# Patient Record
Sex: FEMALE | Race: WHITE | NOT HISPANIC OR LATINO | Employment: UNEMPLOYED | URBAN - METROPOLITAN AREA
[De-identification: names, ages, dates, MRNs, and addresses within clinical notes are randomized per-mention and may not be internally consistent; named-entity substitution may affect disease eponyms.]

---

## 2024-06-10 PROBLEM — M79.7 FIBROMYALGIA: Status: ACTIVE | Noted: 2024-06-10

## 2024-06-10 PROBLEM — R76.8 ANA POSITIVE: Status: ACTIVE | Noted: 2024-06-10

## 2024-06-10 PROBLEM — M19.90 OSTEOARTHRITIS: Status: ACTIVE | Noted: 2024-06-10

## 2024-07-08 RX ORDER — DULOXETIN HYDROCHLORIDE 60 MG/1
60 CAPSULE, DELAYED RELEASE ORAL 2 TIMES DAILY
Qty: 60 CAPSULE | Refills: 0 | Status: SHIPPED | OUTPATIENT
Start: 2024-07-08 | End: 2024-07-11 | Stop reason: SDUPTHER

## 2024-07-10 NOTE — ASSESSMENT & PLAN NOTE
* Medications/treatments/interventions tried include: Tylenol, Flexeril, Skelaxin (allergic), Norco (allergic), gabapentin (didn't help), Mobic, Lyrica, Ambien, nabumetone, she has seen podiatry, she has seen a neurologist, she saw an orthopaedic surgeon, she saw another rheumatologist, Prednisone, cortisone shot in her feet, cortisone shots in her hip/back, Tramadol (makes her feel high), she has done physical therapy, she has been referred to pain management (Dr. Odell), duloxetine.    1. H & P consistent with this diagnosis.   2. Encourage aerobic activity and sleep hygiene.  3. If she has not had a sleep study/consultation consider getting this done.   4. She is taking Celebrex from PCP.  5. She is now taking Lyrica from PCP.  Recommend she discuss dosing with PCP.  6. Weight loss would be helpful.  7. Continue tizanidine. Refilled today  8. She has tried/failed gabapentin in the past.   9. As a rheumatologist I do not try and determine if my patients can or cannot work. I do not do any type of functional assessment/evaluation. I have no idea how long she can or cannot sit, stand, walk, etc. I do not know how much she can lift, pull, push, etc. We do not do these kinds of tests. I encourage patients with fibromyalgia to be active. Activity is thought to improve myofascial pain.  10. She is following with pain management.   11. She takes Norco for pain.   12. She has seen podiatry for her feet.   13. She has seen a neurologist for her headaches.   14. She has seen an orthopaedic surgeon  15. Continue Cymbalta 60 mg twice/day. Refill today   16. Inflammation markers were normal 12/2023

## 2024-07-10 NOTE — PROGRESS NOTES
Office Visit       Date: 07/11/2024   Patient Name: Mally Milan  MRN: [id number]  YOB: 1965    Referring Physician: Ant Herrera*     Chief Complaint:   Chief Complaint   Patient presents with    Osteoarthritis    Fibromyalgia       History of Present Illness: Mally Milan is a 59 y.o. female who is here today for follow-up of osteoarthritis and fibromyalgia.    She has chronic/constant pain. Her DIANNE test was 1:160 on 3/27/20. All other autoimmune testing was normal. At her visit in November 2021 she asked us to repeat labs as she had been lost to follow up since 3/2020. All autoimmune testing was normal and her DIANNE was 1:80 in November 2021. We currently prescribe her duloxetine and tizanidine. From other providers she is prescribed Celebrex, Lortab, Flexeril, magnesium, Ambien, and Lyrica.    She rates her pain today as 5/10 in severity. She has 2 hours a day of morning stiffness. No red or hot joints.  No joint swelling on exam.  She reports diffuse itching.  She does not have rash.  She has seen her PCP and is prescribed hydroxyzine for the itching.  She has also seen a dermatologist.      Subjective   Review of systems:  Review of Systems   Constitutional:  Positive for fatigue and unexpected weight change.   Cardiovascular:         Claudication   Gastrointestinal:  Positive for abdominal distention, constipation and nausea.        Heartburn, and early satiety   Endocrine: Positive for cold intolerance.   Musculoskeletal:  Positive for arthralgias, back pain, gait problem, myalgias and neck pain.   Allergic/Immunologic: Positive for environmental allergies.   Neurological:  Positive for dizziness, weakness, numbness and headaches.        Tingling   Hematological:  Bruises/bleeds easily.   Psychiatric/Behavioral:  Positive for dysphoric mood and sleep disturbance. The patient is nervous/anxious.    All other systems reviewed and are negative.      Past Medical  History:   Past Medical History:   Diagnosis Date    Anemia     Asthma     Diabetes     Elevated lipids     Fibromyalgia     GERD (gastroesophageal reflux disease)     Headache, migraine     Hypertension     Osteoarthritis     Restless leg syndrome     Thyroid disease        Past Surgical History:   Past Surgical History:   Procedure Laterality Date    CARPAL TUNNEL RELEASE      CHOLECYSTECTOMY      COLONOSCOPY      HYSTERECTOMY      TUBAL ABDOMINAL LIGATION         Family History:   Family History   Problem Relation Age of Onset    Hypertension Mother     Arthritis Mother     Hypertension Father     Arthritis Father     Cancer Brother     Hypertension Brother     Stroke Other     Diabetes Other     Arthritis Other        Social History:   Social History     Socioeconomic History    Marital status:    Tobacco Use    Smoking status: Never    Smokeless tobacco: Never   Vaping Use    Vaping status: Never Used   Substance and Sexual Activity    Alcohol use: Never    Drug use: Never    Sexual activity: Defer       Medications:   Current Outpatient Medications:     albuterol sulfate  (90 Base) MCG/ACT inhaler, Inhale 2 puffs Every 4 (Four) to 6 (Six) Hours As Needed., Disp: , Rfl:     amantadine (SYMMETREL) 100 MG tablet, , Disp: , Rfl:     ascorbic acid (VITAMIN C) 100 MG tablet, , Disp: , Rfl:     Atogepant (Qulipta) 60 MG tablet, Take 1 tablet by mouth Daily., Disp: , Rfl:     atorvastatin (LIPITOR) 80 MG tablet, Take 1 tablet by mouth Daily., Disp: , Rfl:     Biotin 1000 MCG tablet, , Disp: , Rfl:     celecoxib (CeleBREX) 200 MG capsule, Take 1 capsule by mouth Daily As Needed for Mild Pain., Disp: , Rfl:     cyanocobalamin (VITAMIN B-12N) 50 MCG tablet, , Disp: , Rfl:     DULoxetine (CYMBALTA) 60 MG capsule, Take 1 capsule by mouth 2 (Two) Times a Day., Disp: 60 capsule, Rfl: 5    Ergocalciferol (VITAMIN D2 PO), Take 1 capsule by mouth 1 (One) Time Per Week. 50,000units, Disp: , Rfl:     esomeprazole  (nexIUM) 40 MG capsule, Take 1 capsule by mouth Daily., Disp: , Rfl:     estradiol (CLIMARA) 0.05 MG/24HR patch, Place 1 patch on the skin as directed by provider 2 (Two) Times a Week., Disp: , Rfl:     famotidine (PEPCID) 40 MG tablet, Take 1 tablet by mouth. Every 2 days at bedtime, Disp: , Rfl:     fenofibrate (TRICOR) 48 MG tablet, Take 1 tablet by mouth Daily., Disp: , Rfl:     fexofenadine (ALLEGRA) 180 MG tablet, Take 1 tablet by mouth every night at bedtime., Disp: , Rfl:     HYDROcodone-acetaminophen (NORCO) 5-325 MG per tablet, Take 1 tablet by mouth Every 6 (Six) Hours As Needed (pain)., Disp: , Rfl:     hydrOXYzine (ATARAX) 25 MG tablet, Take 1 tablet by mouth 3 (Three) Times a Day As Needed., Disp: , Rfl:     levothyroxine (SYNTHROID, LEVOTHROID) 25 MCG tablet, Take 1 tablet by mouth Daily., Disp: , Rfl:     Magnesium 200 MG tablet, , Disp: , Rfl:     magnesium oxide (MAG-OX) 400 MG tablet, Take 1 tablet by mouth Daily., Disp: , Rfl:     montelukast (SINGULAIR) 10 MG tablet, Take 1 tablet by mouth Every Night., Disp: , Rfl:     ondansetron ODT (ZOFRAN-ODT) 4 MG disintegrating tablet, Take 1 tablet (4 mg total) by mouth every 8 (eight) hours as needed for Nausea., Disp: , Rfl:     pregabalin (LYRICA) 75 MG capsule, Take 1 capsule by mouth 2 (Two) Times a Day., Disp: , Rfl:     Rimegepant Sulfate (Nurtec) 75 MG tablet dispersible tablet, Place 1 tablet on the tongue As Needed (for migraines)., Disp: , Rfl:     Semaglutide,0.25 or 0.5MG/DOS, (Ozempic, 0.25 or 0.5 MG/DOSE,) 2 MG/1.5ML solution pen-injector, Inject 0.25 mg under the skin into the appropriate area as directed 1 (One) Time Per Week. For 4 weeks Then inject .5 mg once a week for 2 weeks, Disp: , Rfl:     tiotropium (SPIRIVA) 18 MCG per inhalation capsule, Inhale 1 capsule (18 mcg total) by mouth via inhaler daily., Disp: , Rfl:     tiZANidine (ZANAFLEX) 2 MG tablet, Take 1 tablet by mouth Every 8 (Eight) Hours As Needed for Muscle Spasms.,  "Disp: 90 tablet, Rfl: 5    Zavegepant HCl (Zavzpret) 10 MG/ACT solution, 1 spray into the nostril(s) as directed by provider As Needed. Not to exceed 10mg in 24hrs, Disp: , Rfl:     zolpidem CR (AMBIEN CR) 12.5 MG CR tablet, Take 1 tablet by mouth Every Night., Disp: , Rfl:     Allergies:   Allergies   Allergen Reactions    Carbamazepine Itching    Codeine Unknown - Low Severity    Hydrocodone Bitartrate [Hydrocodone] Unknown - Low Severity    Metaxalone Unknown - Low Severity    Minocycline Hives       I reviewed the patient's chief complaint, history of present illness, review of systems, past medical history, surgical history, family history, social history, medications and allergy list.     Objective    Vital Signs:   Vitals:    07/11/24 1528   BP: 108/72   BP Location: Left arm   Patient Position: Sitting   Cuff Size: Adult   Pulse: 91   Temp: 97.9 °F (36.6 °C)   Weight: 78.6 kg (173 lb 3.2 oz)   Height: 157.5 cm (62\")   PainSc:   5   PainLoc: Generalized  Comment: joints     Body mass index is 31.68 kg/m².       Physical Exam:  Physical Exam  Constitutional:       Appearance: Normal appearance.   HENT:      Head: Normocephalic and atraumatic.   Eyes:      Conjunctiva/sclera: Conjunctivae normal.      Pupils: Pupils are equal, round, and reactive to light.   Cardiovascular:      Rate and Rhythm: Normal rate and regular rhythm.      Heart sounds: Normal heart sounds.   Pulmonary:      Effort: Pulmonary effort is normal.      Breath sounds: Normal breath sounds.   Musculoskeletal:         General: Normal range of motion.      Cervical back: Normal range of motion.   Skin:     General: Skin is warm and dry.   Neurological:      General: No focal deficit present.      Mental Status: She is alert and oriented to person, place, and time.   Psychiatric:         Mood and Affect: Mood normal.         Behavior: Behavior normal.         Thought Content: Thought content normal.         Judgment: Judgment normal.      "     Results Review:   Imaging Results (Last 24 Hours)       ** No results found for the last 24 hours. **            Assessment / Plan    Assessment/Plan:   Diagnoses and all orders for this visit:    1. Fibromyalgia (Primary)  Assessment & Plan:  * Medications/treatments/interventions tried include: Tylenol, Flexeril, Skelaxin (allergic), Norco (allergic), gabapentin (didn't help), Mobic, Lyrica, Ambien, nabumetone, she has seen podiatry, she has seen a neurologist, she saw an orthopaedic surgeon, she saw another rheumatologist, Prednisone, cortisone shot in her feet, cortisone shots in her hip/back, Tramadol (makes her feel high), she has done physical therapy, she has been referred to pain management (Dr. Odell), duloxetine.    1. H & P consistent with this diagnosis.   2. Encourage aerobic activity and sleep hygiene.  3. If she has not had a sleep study/consultation consider getting this done.   4. She is taking Celebrex from PCP.  5. She is now taking Lyrica from PCP.  Recommend she discuss dosing with PCP.  6. Weight loss would be helpful.  7. Continue tizanidine. Refilled today  8. She has tried/failed gabapentin in the past.   9. As a rheumatologist I do not try and determine if my patients can or cannot work. I do not do any type of functional assessment/evaluation. I have no idea how long she can or cannot sit, stand, walk, etc. I do not know how much she can lift, pull, push, etc. We do not do these kinds of tests. I encourage patients with fibromyalgia to be active. Activity is thought to improve myofascial pain.  10. She is following with pain management.   11. She takes Norco for pain.   12. She has seen podiatry for her feet.   13. She has seen a neurologist for her headaches.   14. She has seen an orthopaedic surgeon  15. Continue Cymbalta 60 mg twice/day. Refill today   16. Inflammation markers were normal 12/2023    Orders:  -     CBC Auto Differential; Future  -     Comprehensive Metabolic Panel;  Future    2. Osteoarthritis, unspecified osteoarthritis type, unspecified site  Assessment & Plan:  1. She has chronic/constant pain   2. Tylenol PRN is ok as directed.   3. Weight loss would be helpful.  4. She takes hydrocodone/APAP PRN   5. She was intolerant of Tramadol   6. She has seen an orthopaedic surgeon.  7. She has seen podiatry for her feet.   8. She has tried NSAIDS like Mobic, nabumetone, etc. She is currently on Celebrex per PCP.  9. She has received cortisone injections.   10. She has seen a pain management specialist.   11. For this we have nothing else to add  12. She has done some physical therapy  13.  She is taking Lyrica 75 mg twice daily with PCP      3. Itching  Assessment & Plan:  She has chronic generalized itching. No rash.   She is prescribed hydroxyzine for anxiety that she notes she is having to use often for itching.  She has seen a dermatologist.   She is using OTC moisturizer.  Check labs today.  Consider referral to allergist  Recommend she discuss Lyrica dosing with PCP.  Itching can be neuropathic in nature.    Orders:  -     Protein Elec + Interp, Serum; Future    4. Fatigue, unspecified type  Assessment & Plan:  Check labs today    Orders:  -     Protein Elec + Interp, Serum; Future    Other orders  -     DULoxetine (CYMBALTA) 60 MG capsule; Take 1 capsule by mouth 2 (Two) Times a Day.  Dispense: 60 capsule; Refill: 5  -     tiZANidine (ZANAFLEX) 2 MG tablet; Take 1 tablet by mouth Every 8 (Eight) Hours As Needed for Muscle Spasms.  Dispense: 90 tablet; Refill: 5        Follow Up:   Return in about 6 months (around 1/11/2025) for Dr. Steiner.        QUINN Booth  [facility] [place] [facility]

## 2024-07-10 NOTE — ASSESSMENT & PLAN NOTE
1. She has chronic/constant pain   2. Tylenol PRN is ok as directed.   3. Weight loss would be helpful.  4. She takes hydrocodone/APAP PRN   5. She was intolerant of Tramadol   6. She has seen an orthopaedic surgeon.  7. She has seen podiatry for her feet.   8. She has tried NSAIDS like Mobic, nabumetone, etc. She is currently on Celebrex per PCP.  9. She has received cortisone injections.   10. She has seen a pain management specialist.   11. For this we have nothing else to add  12. She has done some physical therapy  13.  She is taking Lyrica 75 mg twice daily with PCP

## 2024-07-11 ENCOUNTER — OFFICE VISIT (OUTPATIENT)
Age: 59
End: 2024-07-11
Payer: COMMERCIAL

## 2024-07-11 VITALS
BODY MASS INDEX: 31.87 KG/M2 | TEMPERATURE: 97.9 F | WEIGHT: 173.2 LBS | HEART RATE: 91 BPM | DIASTOLIC BLOOD PRESSURE: 72 MMHG | HEIGHT: 62 IN | SYSTOLIC BLOOD PRESSURE: 108 MMHG

## 2024-07-11 DIAGNOSIS — L29.9 ITCHING: ICD-10-CM

## 2024-07-11 DIAGNOSIS — M19.90 OSTEOARTHRITIS, UNSPECIFIED OSTEOARTHRITIS TYPE, UNSPECIFIED SITE: ICD-10-CM

## 2024-07-11 DIAGNOSIS — M79.7 FIBROMYALGIA: Primary | ICD-10-CM

## 2024-07-11 DIAGNOSIS — R53.83 FATIGUE, UNSPECIFIED TYPE: ICD-10-CM

## 2024-07-11 PROBLEM — G56.00 CARPAL TUNNEL SYNDROME: Status: ACTIVE | Noted: 2019-11-26

## 2024-07-11 PROBLEM — E55.9 VITAMIN D DEFICIENCY: Status: ACTIVE | Noted: 2021-01-12

## 2024-07-11 PROBLEM — E78.2 MIXED HYPERLIPIDEMIA: Status: ACTIVE | Noted: 2021-08-12

## 2024-07-11 PROBLEM — R20.2 PARESTHESIA: Status: ACTIVE | Noted: 2018-05-30

## 2024-07-11 PROBLEM — E03.9 HYPOTHYROIDISM: Status: ACTIVE | Noted: 2021-08-12

## 2024-07-11 PROBLEM — F41.8 DEPRESSION WITH ANXIETY: Status: ACTIVE | Noted: 2022-11-14

## 2024-07-11 PROBLEM — C44.611 BASAL CELL CARCINOMA (BCC) OF UPPER EXTREMITY: Status: ACTIVE | Noted: 2021-08-12

## 2024-07-11 PROBLEM — E11.9 TYPE 2 DIABETES MELLITUS: Chronic | Status: ACTIVE | Noted: 2021-12-13

## 2024-07-11 PROBLEM — G43.909 MIGRAINE HEADACHE: Status: ACTIVE | Noted: 2021-04-12

## 2024-07-11 PROBLEM — B00.1 RECURRENT HERPES LABIALIS: Status: ACTIVE | Noted: 2021-06-01

## 2024-07-11 PROCEDURE — 99214 OFFICE O/P EST MOD 30 MIN: CPT | Performed by: NURSE PRACTITIONER

## 2024-07-11 RX ORDER — AMANTADINE HYDROCHLORIDE 100 MG/1
TABLET ORAL
COMMUNITY
Start: 2024-06-24

## 2024-07-11 RX ORDER — DULOXETIN HYDROCHLORIDE 60 MG/1
60 CAPSULE, DELAYED RELEASE ORAL 2 TIMES DAILY
Qty: 60 CAPSULE | Refills: 5 | Status: SHIPPED | OUTPATIENT
Start: 2024-07-11

## 2024-07-11 RX ORDER — TIZANIDINE 2 MG/1
2 TABLET ORAL EVERY 8 HOURS PRN
Qty: 90 TABLET | Refills: 5 | Status: SHIPPED | OUTPATIENT
Start: 2024-07-11

## 2024-07-11 RX ORDER — TIOTROPIUM BROMIDE 18 UG/1
CAPSULE ORAL; RESPIRATORY (INHALATION)
COMMUNITY
Start: 2024-01-12 | End: 2025-01-11

## 2024-07-11 RX ORDER — ONDANSETRON 4 MG/1
TABLET, ORALLY DISINTEGRATING ORAL
COMMUNITY
Start: 2024-05-13

## 2024-07-11 RX ORDER — FEXOFENADINE HCL 180 MG/1
180 TABLET ORAL
COMMUNITY
Start: 2024-04-08

## 2024-07-11 RX ORDER — TIZANIDINE 2 MG/1
TABLET ORAL EVERY 8 HOURS PRN
COMMUNITY
Start: 2024-04-05 | End: 2024-07-11 | Stop reason: SDUPTHER

## 2024-07-11 NOTE — ASSESSMENT & PLAN NOTE
She has chronic generalized itching. No rash.   She is prescribed hydroxyzine for anxiety that she notes she is having to use often for itching.  She has seen a dermatologist.   She is using OTC moisturizer.  Check labs today.  Consider referral to allergist  Recommend she discuss Lyrica dosing with PCP.  Itching can be neuropathic in nature.

## 2024-08-01 ENCOUNTER — TELEPHONE (OUTPATIENT)
Age: 59
End: 2024-08-01
Payer: MEDICARE

## 2024-08-01 NOTE — TELEPHONE ENCOUNTER
Pt was asking for lab results but we have not received them from the facility in [facility] where she got them . Pt states she will call to see if they will send them

## 2025-02-05 RX ORDER — DULOXETIN HYDROCHLORIDE 60 MG/1
60 CAPSULE, DELAYED RELEASE ORAL 2 TIMES DAILY
Qty: 60 CAPSULE | Refills: 3 | Status: SHIPPED | OUTPATIENT
Start: 2025-02-05

## 2025-05-07 NOTE — PROGRESS NOTES
Office Visit       Date: 05/15/2025   Patient Name: Mally Milan  MRN: [id number]  YOB: 1965    Referring Physician: No ref. provider found     Chief Complaint:   Chief Complaint   Patient presents with    Follow-up    Osteoarthritis       History of Present Illness: Mally Milan is a 59 y.o. female who is here today for follow-up of osteoarthritis and fibromyalgia. Her DIANNE test was 1:160 on 3/27/20. All other autoimmune testing was normal. At her visit in November 2021 she asked us to repeat labs as she had been lost to follow up since 3/2020. All autoimmune testing was normal and her DIANNE was 1:80 in November 2021.    She was last seen in clinic 7/11/24.    We currently prescribe tizanidine and duloxetine. She is prescribed diclofenac, Ambien, Lyrica, Norco, Flexeril, Fioricet, and hydroxyzine from other providers.  She only takes the tizanidine at bedtime.  The Flexeril she uses during the day.    She rates her pain today as 7/10 in severity. She has 2 hours a day of morning stiffness. No red or hot joints.  No joint swelling on exam. She has chronic/constant pain.     Subjective   Review of systems:  Review of Systems   Constitutional:  Positive for activity change, fatigue and unexpected weight change.   Respiratory:  Positive for shortness of breath.    Cardiovascular:  Positive for leg swelling.   Gastrointestinal:  Positive for nausea.   Endocrine: Positive for cold intolerance and heat intolerance.   Musculoskeletal:  Positive for arthralgias, back pain, gait problem, joint swelling, myalgias, neck pain and neck stiffness.   Neurological:  Positive for weakness, light-headedness, numbness and headaches.   Hematological:  Bruises/bleeds easily.   Psychiatric/Behavioral:  Positive for agitation and sleep disturbance. The patient is nervous/anxious.        Past Medical History:   Past Medical History:   Diagnosis Date    Anemia     Asthma     Diabetes     Elevated lipids      Fibromyalgia     GERD (gastroesophageal reflux disease)     Headache, migraine     Hypertension     Osteoarthritis     Restless leg syndrome     Thyroid disease        Past Surgical History:   Past Surgical History:   Procedure Laterality Date    CARPAL TUNNEL RELEASE      CHOLECYSTECTOMY      COLONOSCOPY      HYSTERECTOMY      TUBAL ABDOMINAL LIGATION         Family History:   Family History   Problem Relation Age of Onset    Hypertension Mother     Arthritis Mother     Hypertension Father     Arthritis Father     Cancer Brother     Hypertension Brother     Stroke Other     Diabetes Other     Arthritis Other        Social History:   Social History     Socioeconomic History    Marital status:    Tobacco Use    Smoking status: Never    Smokeless tobacco: Never   Vaping Use    Vaping status: Never Used   Substance and Sexual Activity    Alcohol use: Never    Drug use: Never    Sexual activity: Defer       Medications:   Current Outpatient Medications:     albuterol sulfate  (90 Base) MCG/ACT inhaler, Inhale 2 puffs Every 4 (Four) to 6 (Six) Hours As Needed., Disp: , Rfl:     amantadine (SYMMETREL) 100 MG tablet, , Disp: , Rfl:     ascorbic acid (VITAMIN C) 100 MG tablet, , Disp: , Rfl:     Atogepant (Qulipta) 60 MG tablet, Take 1 tablet by mouth Daily., Disp: , Rfl:     atorvastatin (LIPITOR) 80 MG tablet, Take 1 tablet by mouth Daily., Disp: , Rfl:     Biotin 1000 MCG tablet, , Disp: , Rfl:     butalbital-acetaminophen-caffeine (FIORICET, ESGIC) -40 MG per tablet, , Disp: , Rfl:     cyanocobalamin (VITAMIN B-12N) 50 MCG tablet, , Disp: , Rfl:     cyclobenzaprine (FLEXERIL) 10 MG tablet, , Disp: , Rfl:     diclofenac (CATAFLAM) 50 MG tablet, , Disp: , Rfl:     DULoxetine (CYMBALTA) 60 MG capsule, Take 1 capsule by mouth 2 (Two) Times a Day., Disp: 180 capsule, Rfl: 1    Ergocalciferol (VITAMIN D2 PO), Take 1 capsule by mouth 1 (One) Time Per Week. 50,000units, Disp: , Rfl:     esomeprazole (nexIUM)  40 MG capsule, Take 1 capsule by mouth Daily., Disp: , Rfl:     estradiol (CLIMARA) 0.05 MG/24HR patch, Place 1 patch on the skin as directed by provider 2 (Two) Times a Week., Disp: , Rfl:     famotidine (PEPCID) 40 MG tablet, Take 1 tablet by mouth. Every 2 days at bedtime, Disp: , Rfl:     fenofibrate (TRICOR) 48 MG tablet, Take 1 tablet by mouth Daily., Disp: , Rfl:     fexofenadine (ALLEGRA) 180 MG tablet, Take 1 tablet by mouth every night at bedtime., Disp: , Rfl:     HYDROcodone-acetaminophen (NORCO) 5-325 MG per tablet, Take 1 tablet by mouth Every 6 (Six) Hours As Needed (pain)., Disp: , Rfl:     hydrOXYzine (ATARAX) 25 MG tablet, Take 1 tablet by mouth 3 (Three) Times a Day As Needed., Disp: , Rfl:     levothyroxine (SYNTHROID, LEVOTHROID) 25 MCG tablet, Take 1 tablet by mouth Daily., Disp: , Rfl:     Magnesium 200 MG tablet, , Disp: , Rfl:     magnesium oxide (MAG-OX) 400 MG tablet, Take 1 tablet by mouth Daily., Disp: , Rfl:     montelukast (SINGULAIR) 10 MG tablet, Take 1 tablet by mouth Every Night., Disp: , Rfl:     ondansetron ODT (ZOFRAN-ODT) 4 MG disintegrating tablet, Take 1 tablet (4 mg total) by mouth every 8 (eight) hours as needed for Nausea., Disp: , Rfl:     pregabalin (LYRICA) 75 MG capsule, Take 1 capsule by mouth 2 (Two) Times a Day., Disp: , Rfl:     Rimegepant Sulfate (Nurtec) 75 MG tablet dispersible tablet, Place 1 tablet on the tongue As Needed (for migraines)., Disp: , Rfl:     Semaglutide,0.25 or 0.5MG/DOS, (Ozempic, 0.25 or 0.5 MG/DOSE,) 2 MG/1.5ML solution pen-injector, Inject 0.25 mg under the skin into the appropriate area as directed 1 (One) Time Per Week. For 4 weeks Then inject .5 mg once a week for 2 weeks, Disp: , Rfl:     tiZANidine (ZANAFLEX) 2 MG tablet, Take 1-2 tablets by mouth Every Night., Disp: 180 tablet, Rfl: 1    Zavegepant HCl (Zavzpret) 10 MG/ACT solution, 1 spray into the nostril(s) as directed by provider As Needed. Not to exceed 10mg in 24hrs, Disp: , Rfl:  "    zolpidem CR (AMBIEN CR) 12.5 MG CR tablet, Take 1 tablet by mouth Every Night., Disp: , Rfl:     tiotropium (SPIRIVA) 18 MCG per inhalation capsule, Inhale 1 capsule (18 mcg total) by mouth via inhaler daily., Disp: , Rfl:     Allergies:   Allergies   Allergen Reactions    Carbamazepine Itching    Codeine Unknown - Low Severity    Hydrocodone Bitartrate [Hydrocodone] Unknown - Low Severity    Metaxalone Unknown - Low Severity    Minocycline Hives    Rosuvastatin Headache     Worse headache       I reviewed the patient's chief complaint, history of present illness, review of systems, past medical history, surgical history, family history, social history, medications and allergy list.     Objective    Vital Signs:   Vitals:    05/15/25 1344   BP: 134/72   Pulse: 92   Temp: 98 °F (36.7 °C)   Weight: 85.3 kg (188 lb)   Height: 157.5 cm (62\")   PainSc: 7    PainLoc: Finger  Comment: hips, hands       Body mass index is 34.39 kg/m².   Defer to PCP    Physical Exam:  Physical Exam  Constitutional:       Appearance: Normal appearance. She is obese.   HENT:      Head: Normocephalic and atraumatic.   Eyes:      Conjunctiva/sclera: Conjunctivae normal.      Pupils: Pupils are equal, round, and reactive to light.   Cardiovascular:      Rate and Rhythm: Normal rate.   Pulmonary:      Effort: Pulmonary effort is normal.   Musculoskeletal:         General: Normal range of motion.      Cervical back: Normal range of motion.      Comments: Myofascial tenderness present  She has Audrey's and Heberden's nodes   Skin:     General: Skin is warm and dry.   Neurological:      General: No focal deficit present.      Mental Status: She is alert and oriented to person, place, and time.   Psychiatric:         Mood and Affect: Mood normal.         Behavior: Behavior normal.         Thought Content: Thought content normal.         Judgment: Judgment normal.        Assessment / Plan      Assessment & Plan  Fibromyalgia  * " Medications/treatments/interventions tried include: Tylenol, Flexeril, Skelaxin (allergic), Norco (allergic), gabapentin (didn't help), Mobic, Lyrica, Ambien, nabumetone, she has seen podiatry, she has seen a neurologist, she saw an orthopaedic surgeon, she saw another rheumatologist, Prednisone, cortisone shot in her feet, cortisone shots in her hip/back, Tramadol (makes her feel high), she has done physical therapy, she has been referred to pain management (Dr. Odell), duloxetine.    1. H & P consistent with this diagnosis.   2. Encourage aerobic activity and sleep hygiene.  3. If she has not had a sleep study/consultation consider getting this done.   4. She is taking diclofenac from PCP.  5. She is taking Lyrica from PCP.  Recommend she discuss dosing with PCP.  6. Weight loss would be helpful.  7. Continue tizanidine. Refilled today. She takes this only at bedtime. She takes Flexeril in the daytime from another provider. She understands not to take the medications simultaneously.  8. She has tried/failed gabapentin in the past.   9. As a rheumatologist I do not try and determine if my patients can or cannot work. I do not do any type of functional assessment/evaluation. I have no idea how long she can or cannot sit, stand, walk, etc. I do not know how much she can lift, pull, push, etc. We do not do these kinds of tests. I encourage patients with fibromyalgia to be active. Activity is thought to improve myofascial pain.  10. She is following with pain management.   11. She takes Norco for pain.   12. She has seen podiatry for her feet.   13. She has seen a neurologist for her headaches.   14. She has seen an orthopaedic surgeon.   15. Continue Cymbalta 60 mg twice/day. Refill today   16. RTC 6 months     Osteoarthritis, unspecified osteoarthritis type, unspecified site  1. She has chronic/constant pain   2. Tylenol PRN is ok as directed.   3. Weight loss would be helpful.  4. She takes hydrocodone/APAP PRN   5.  She was intolerant of Tramadol   6. She has seen an orthopaedic surgeon.   7. She has seen podiatry for her feet.   8. She has tried NSAIDS like Mobic, nabumetone, Celebrex, etc. She is currently on diclofenac per PCP.  9. She has received cortisone injections in her knees.   10. She has seen a pain management specialist.   11. For this we have nothing else to add  12. She has done some physical therapy  13.  She is taking Lyrica 75 mg twice daily with PCP    Itching  She reports this was related to previous shingles   She is prescribed hydroxyzine   Lyrica has helped      Follow Up:   Return in about 6 months (around 11/15/2025) for Dr. Steiner.        QUINN Booth   [facility]

## 2025-05-07 NOTE — ASSESSMENT & PLAN NOTE
* Medications/treatments/interventions tried include: Tylenol, Flexeril, Skelaxin (allergic), Norco (allergic), gabapentin (didn't help), Mobic, Lyrica, Ambien, nabumetone, she has seen podiatry, she has seen a neurologist, she saw an orthopaedic surgeon, she saw another rheumatologist, Prednisone, cortisone shot in her feet, cortisone shots in her hip/back, Tramadol (makes her feel high), she has done physical therapy, she has been referred to pain management (Dr. Odell), duloxetine.    1. H & P consistent with this diagnosis.   2. Encourage aerobic activity and sleep hygiene.  3. If she has not had a sleep study/consultation consider getting this done.   4. She is taking diclofenac from PCP.  5. She is taking Lyrica from PCP.  Recommend she discuss dosing with PCP.  6. Weight loss would be helpful.  7. Continue tizanidine. Refilled today. She takes this only at bedtime. She takes Flexeril in the daytime from another provider. She understands not to take the medications simultaneously.  8. She has tried/failed gabapentin in the past.   9. As a rheumatologist I do not try and determine if my patients can or cannot work. I do not do any type of functional assessment/evaluation. I have no idea how long she can or cannot sit, stand, walk, etc. I do not know how much she can lift, pull, push, etc. We do not do these kinds of tests. I encourage patients with fibromyalgia to be active. Activity is thought to improve myofascial pain.  10. She is following with pain management.   11. She takes Norco for pain.   12. She has seen podiatry for her feet.   13. She has seen a neurologist for her headaches.   14. She has seen an orthopaedic surgeon.   15. Continue Cymbalta 60 mg twice/day. Refill today   16. RTC 6 months

## 2025-05-07 NOTE — ASSESSMENT & PLAN NOTE
1. She has chronic/constant pain   2. Tylenol PRN is ok as directed.   3. Weight loss would be helpful.  4. She takes hydrocodone/APAP PRN   5. She was intolerant of Tramadol   6. She has seen an orthopaedic surgeon.   7. She has seen podiatry for her feet.   8. She has tried NSAIDS like Mobic, nabumetone, Celebrex, etc. She is currently on diclofenac per PCP.  9. She has received cortisone injections in her knees.   10. She has seen a pain management specialist.   11. For this we have nothing else to add  12. She has done some physical therapy  13.  She is taking Lyrica 75 mg twice daily with PCP

## 2025-05-07 NOTE — ASSESSMENT & PLAN NOTE
She reports this was related to previous shingles   She is prescribed hydroxyzine   Lyrica has helped

## 2025-05-15 ENCOUNTER — LAB (OUTPATIENT)
Facility: HOSPITAL | Age: 60
End: 2025-05-15
Payer: COMMERCIAL

## 2025-05-15 ENCOUNTER — OFFICE VISIT (OUTPATIENT)
Age: 60
End: 2025-05-15
Payer: COMMERCIAL

## 2025-05-15 VITALS
SYSTOLIC BLOOD PRESSURE: 134 MMHG | HEART RATE: 92 BPM | HEIGHT: 62 IN | DIASTOLIC BLOOD PRESSURE: 72 MMHG | TEMPERATURE: 98 F | BODY MASS INDEX: 34.6 KG/M2 | WEIGHT: 188 LBS

## 2025-05-15 DIAGNOSIS — M79.7 FIBROMYALGIA: Primary | ICD-10-CM

## 2025-05-15 DIAGNOSIS — R53.83 FATIGUE, UNSPECIFIED TYPE: ICD-10-CM

## 2025-05-15 DIAGNOSIS — M19.90 OSTEOARTHRITIS, UNSPECIFIED OSTEOARTHRITIS TYPE, UNSPECIFIED SITE: ICD-10-CM

## 2025-05-15 DIAGNOSIS — L29.9 ITCHING: ICD-10-CM

## 2025-05-15 DIAGNOSIS — M79.7 FIBROMYALGIA: ICD-10-CM

## 2025-05-15 LAB
ALBUMIN SERPL-MCNC: 4 G/DL (ref 3.5–5.2)
ALBUMIN/GLOB SERPL: 1.4 G/DL
ALP SERPL-CCNC: 97 U/L (ref 39–117)
ALT SERPL W P-5'-P-CCNC: 10 U/L (ref 1–33)
ANION GAP SERPL CALCULATED.3IONS-SCNC: 10.1 MMOL/L (ref 5–15)
AST SERPL-CCNC: 19 U/L (ref 1–32)
BASOPHILS # BLD AUTO: 0.05 10*3/MM3 (ref 0–0.2)
BASOPHILS NFR BLD AUTO: 0.5 % (ref 0–1.5)
BILIRUB SERPL-MCNC: <0.2 MG/DL (ref 0–1.2)
BUN SERPL-MCNC: 18 MG/DL (ref 6–20)
BUN/CREAT SERPL: 23.7 (ref 7–25)
CALCIUM SPEC-SCNC: 9.1 MG/DL (ref 8.6–10.5)
CHLORIDE SERPL-SCNC: 102 MMOL/L (ref 98–107)
CO2 SERPL-SCNC: 27.9 MMOL/L (ref 22–29)
CREAT SERPL-MCNC: 0.76 MG/DL (ref 0.57–1)
DEPRECATED RDW RBC AUTO: 38.2 FL (ref 37–54)
EGFRCR SERPLBLD CKD-EPI 2021: 91.5 ML/MIN/1.73
EOSINOPHIL # BLD AUTO: 0.18 10*3/MM3 (ref 0–0.4)
EOSINOPHIL NFR BLD AUTO: 1.7 % (ref 0.3–6.2)
ERYTHROCYTE [DISTWIDTH] IN BLOOD BY AUTOMATED COUNT: 11.8 % (ref 12.3–15.4)
GLOBULIN UR ELPH-MCNC: 2.9 GM/DL
GLUCOSE SERPL-MCNC: 83 MG/DL (ref 65–99)
HCT VFR BLD AUTO: 36.8 % (ref 34–46.6)
HGB BLD-MCNC: 12.2 G/DL (ref 12–15.9)
IMM GRANULOCYTES # BLD AUTO: 0.15 10*3/MM3 (ref 0–0.05)
IMM GRANULOCYTES NFR BLD AUTO: 1.4 % (ref 0–0.5)
LYMPHOCYTES # BLD AUTO: 4.4 10*3/MM3 (ref 0.7–3.1)
LYMPHOCYTES NFR BLD AUTO: 41.6 % (ref 19.6–45.3)
MCH RBC QN AUTO: 29.6 PG (ref 26.6–33)
MCHC RBC AUTO-ENTMCNC: 33.2 G/DL (ref 31.5–35.7)
MCV RBC AUTO: 89.3 FL (ref 79–97)
MONOCYTES # BLD AUTO: 1.13 10*3/MM3 (ref 0.1–0.9)
MONOCYTES NFR BLD AUTO: 10.7 % (ref 5–12)
NEUTROPHILS NFR BLD AUTO: 4.66 10*3/MM3 (ref 1.7–7)
NEUTROPHILS NFR BLD AUTO: 44.1 % (ref 42.7–76)
NRBC BLD AUTO-RTO: 0 /100 WBC (ref 0–0.2)
PLATELET # BLD AUTO: 357 10*3/MM3 (ref 140–450)
PMV BLD AUTO: 10.2 FL (ref 6–12)
POTASSIUM SERPL-SCNC: 4.4 MMOL/L (ref 3.5–5.2)
PROT SERPL-MCNC: 6.9 G/DL (ref 6–8.5)
RBC # BLD AUTO: 4.12 10*6/MM3 (ref 3.77–5.28)
SODIUM SERPL-SCNC: 140 MMOL/L (ref 136–145)
WBC NRBC COR # BLD AUTO: 10.57 10*3/MM3 (ref 3.4–10.8)

## 2025-05-15 PROCEDURE — 80053 COMPREHEN METABOLIC PANEL: CPT

## 2025-05-15 PROCEDURE — 84165 PROTEIN E-PHORESIS SERUM: CPT

## 2025-05-15 PROCEDURE — 85025 COMPLETE CBC W/AUTO DIFF WBC: CPT

## 2025-05-15 RX ORDER — BUTALBITAL, ACETAMINOPHEN AND CAFFEINE 50; 325; 40 MG/1; MG/1; MG/1
TABLET ORAL
COMMUNITY
Start: 2025-01-31

## 2025-05-15 RX ORDER — CELECOXIB 200 MG/1
200 CAPSULE ORAL DAILY
Qty: 90 CAPSULE | Refills: 1 | Status: CANCELLED | OUTPATIENT
Start: 2025-05-15

## 2025-05-15 RX ORDER — DICLOFENAC POTASSIUM 50 MG/1
TABLET, FILM COATED ORAL
COMMUNITY
Start: 2025-01-07

## 2025-05-15 RX ORDER — TIZANIDINE 2 MG/1
2-4 TABLET ORAL NIGHTLY
Qty: 180 TABLET | Refills: 1 | Status: SHIPPED | OUTPATIENT
Start: 2025-05-15

## 2025-05-15 RX ORDER — DULOXETIN HYDROCHLORIDE 60 MG/1
60 CAPSULE, DELAYED RELEASE ORAL 2 TIMES DAILY
Qty: 180 CAPSULE | Refills: 1 | Status: SHIPPED | OUTPATIENT
Start: 2025-05-15

## 2025-05-15 RX ORDER — CYCLOBENZAPRINE HCL 10 MG
TABLET ORAL
COMMUNITY
Start: 2025-01-18

## 2025-05-19 LAB
ALBUMIN SERPL ELPH-MCNC: 3.2 G/DL (ref 2.9–4.4)
ALBUMIN/GLOB SERPL: 1 {RATIO} (ref 0.7–1.7)
ALPHA1 GLOB SERPL ELPH-MCNC: 0.2 G/DL (ref 0–0.4)
ALPHA2 GLOB SERPL ELPH-MCNC: 0.9 G/DL (ref 0.4–1)
B-GLOBULIN SERPL ELPH-MCNC: 1.5 G/DL (ref 0.7–1.3)
GAMMA GLOB SERPL ELPH-MCNC: 0.7 G/DL (ref 0.4–1.8)
GLOBULIN SER CALC-MCNC: 3.3 G/DL (ref 2.2–3.9)
LABORATORY COMMENT REPORT: ABNORMAL
M PROTEIN SERPL ELPH-MCNC: ABNORMAL G/DL
PROT PATTERN SERPL ELPH-IMP: ABNORMAL
PROT SERPL-MCNC: 6.5 G/DL (ref 6–8.5)